# Patient Record
Sex: FEMALE | Race: WHITE | NOT HISPANIC OR LATINO | URBAN - METROPOLITAN AREA
[De-identification: names, ages, dates, MRNs, and addresses within clinical notes are randomized per-mention and may not be internally consistent; named-entity substitution may affect disease eponyms.]

---

## 2021-04-19 ENCOUNTER — TELEPHONE (OUTPATIENT)
Dept: OBGYN CLINIC | Facility: CLINIC | Age: 56
End: 2021-04-19

## 2021-04-19 NOTE — TELEPHONE ENCOUNTER
Patient l/m requesting screening mammogram order  Patient had not had a mammogram done in 2 years due to Kristi  She is scheduled for a WA with Dr Francia Harris on 06/2021  L/m on pt's vm advising at time of visit for Inderjit Layne, Dr Francia Harris will provide with screening mammogram order

## 2021-04-20 NOTE — TELEPHONE ENCOUNTER
Sharon Alonzo/prince she is getting anxious about waiting for her mammogram  She has a history of having call backs for further imaging  Return call to patient, prior mammogram done 7/2019  She did not have mammo in 2020 due to covid 19  She reports she has a history of needing breast u/s after mammogram in prior years  Her WA was moved by SO from 05/19/2021 to 7/5/2021  Dr Karla Lange, ok to provide screening mammogram order?

## 2021-09-13 ENCOUNTER — VBI (OUTPATIENT)
Dept: ADMINISTRATIVE | Facility: OTHER | Age: 56
End: 2021-09-13

## 2021-09-13 NOTE — TELEPHONE ENCOUNTER
Pt wants to know if she can have her medication automatically refilled every 3 months. LOV 08/05/2020   Upon review of the In Basket request we were able to locate, review, and update the patient chart as requested for Mammogram     Any additional questions or concerns should be emailed to the Practice Liaisons via [email or web address] email, please do not reply via In Basket      Thank you  Roxanna Lovell

## 2022-07-06 ENCOUNTER — ANNUAL EXAM (OUTPATIENT)
Dept: OBGYN CLINIC | Facility: CLINIC | Age: 57
End: 2022-07-06
Payer: COMMERCIAL

## 2022-07-06 VITALS
HEIGHT: 65 IN | DIASTOLIC BLOOD PRESSURE: 70 MMHG | BODY MASS INDEX: 23.16 KG/M2 | WEIGHT: 139 LBS | SYSTOLIC BLOOD PRESSURE: 110 MMHG

## 2022-07-06 DIAGNOSIS — Z12.4 SCREENING FOR CERVICAL CANCER: ICD-10-CM

## 2022-07-06 DIAGNOSIS — N92.6 IRREGULAR BLEEDING: ICD-10-CM

## 2022-07-06 DIAGNOSIS — Z01.411 ENCNTR FOR GYN EXAM (GENERAL) (ROUTINE) W ABNORMAL FINDINGS: Primary | ICD-10-CM

## 2022-07-06 DIAGNOSIS — Z12.31 ENCOUNTER FOR SCREENING MAMMOGRAM FOR MALIGNANT NEOPLASM OF BREAST: ICD-10-CM

## 2022-07-06 PROCEDURE — S0612 ANNUAL GYNECOLOGICAL EXAMINA: HCPCS | Performed by: OBSTETRICS & GYNECOLOGY

## 2022-07-06 RX ORDER — ATORVASTATIN CALCIUM 20 MG/1
20 TABLET, FILM COATED ORAL DAILY
COMMUNITY
Start: 2022-06-30

## 2022-07-06 RX ORDER — NITROGLYCERIN 0.1MG/HR
PATCH, TRANSDERMAL 24 HOURS TRANSDERMAL
COMMUNITY
Start: 2022-06-27

## 2022-07-06 NOTE — PROGRESS NOTES
Annual Wellness Visit  [address]   [address], [address], [facility], [place]    ASSESSMENT/PLAN: Delmi Craig is a 62 y o  B0E0 who presents for annual gynecologic exam     Encounter for routine gynecologic examination  - Routine well woman exam completed today  - Cervical Cancer Screening: Current ASCCP Guidelines reviewed  Last Pap: 04/17/2017  Next Pap Due: today, routine   - Contraceptive counseling discussed  Current contraception: vasectomy  - Breast Cancer Screening: Last Mammogram 07/16/2019  Has order for mammogram to be done in 2022  - The following were reviewed in today's visit: breast self exam    Additional problems addressed during this visit:  1  Encntr for gyn exam (general) (routine) w abnormal findings    2  Screening for cervical cancer  -     IGP, Aptima HPV, Rfx 16/18,45    3  Encounter for screening mammogram for malignant neoplasm of breast        -   Has order for mammogram, provided in May 2022    4  Irregular bleeding  -  Advised patient to keep menstrual calender  Have labs and U/S done this week  Inform Gyn if vaginal bleeding does not stop within 7 days from start of menses/bleeding  RTO in 2-4 weeks to review results of labs, U/S and bleeding pattern  -     Follicle stimulating hormone; Future  -     Luteinizing hormone; Future  -     Prolactin; Future  -     TSH, 3rd generation with Free T4 reflex; Future  -     ESTRADIOL, FREE SERUM; Future; Expected date: 43/13/0516  -     Follicle stimulating hormone  -     Luteinizing hormone  -     Prolactin  -     TSH, 3rd generation with Free T4 reflex  -     ESTRADIOL, FREE SERUM  -     US pelvis complete w transvaginal; Future; Expected date: 07/06/2022    Next visit: 1 yr for Inderjit Maki  OC in 2-4 wks    CC:  Annual Gynecologic Examination    HPI: Delmi Craig is a 62 y o  E5T8 who presents for annual gynecologic examination    Patient presents for Gyn exam   Has had regular menses q 4 weeks until 5 months ago  No vaginal bleeding since then until 2022  Patient describes current vaginal bleeding as regular menstrual flow  Denies having any dysmenorrhea or heavy flow      Gyn History  Patient's last menstrual period was 2022 (exact date)  Last Pap: 2017 was normal    She  reports being sexually active and has had partner(s) who are male  She reports using the following method of birth control/protection: Male Sterilization  OB History      Past Medical History:  No date: Breast cyst, left  No date: Dense breasts  No date: Endometrial polyp     Past Surgical History:  :  SECTION  No date: COLONOSCOPY      Comment:  [facility]  2015: HYSTEROSCOPY W/ POLYPECTOMY      Comment:  w/D&C  2019: MAMMO (HISTORICAL); Bilateral      Comment:  [facility] BI-RADS 2  BBenign findings   Extremely dense greater               than 75% glandular breast tissue  : VAGINAL BIRTH AFTER  SECTION     Family History   Problem Relation Age of Onset    Hyperlipidemia Mother    [facility] Rheum arthritis Mother     Stroke Father     No Known Problems Daughter     Celiac disease Son     No Known Problems Son     Hyperlipidemia Maternal Grandmother     Heart disease Maternal Grandmother     Heart attack Maternal Grandmother     Heart disease Maternal Grandfather     Stroke Maternal Grandfather     COPD Paternal Grandmother     Stomach cancer Paternal Grandfather     Colon cancer Maternal Aunt     Breast cancer Paternal Aunt     Ovarian cancer Paternal Aunt         Social History     Tobacco Use    Smoking status: Never Smoker    Smokeless tobacco: Never Used   Vaping Use    Vaping Use: Never used   Substance Use Topics    Alcohol use: Yes     Comment: Socially on  weekends (1-4/week)     Drug use: Never     Comment: No           Current Outpatient Medications:     atorvastatin (LIPITOR) 20 mg tablet, Take 20 mg by mouth daily, Disp: , Rfl:     Multiple Vitamins-Minerals (Multivitamin Women 50+) TABS, Take 1 tablet by mouth daily, Disp: , Rfl:     nitroglycerin (NITRODUR) 0 1 mg/hr, apply 1 PATCH by transdermal route daily  cut in half AND apply TO site FOR 24 hours  discontinue if headaches OR SKIN irritation  allow nitrate-free interval of approx 10-12 hours PER 24 hours period  , Disp: , Rfl:     atorvastatin (LIPITOR) 10 mg tablet, Take 1 tablet by mouth daily (Patient not taking: Reported on 7/6/2022), Disp: , Rfl:     Calcium Carbonate (CALCIUM 500 PO), Take 1 tablet by mouth daily, Disp: , Rfl:     She has No Known Allergies       ROS negative except as noted in HPI    Objective:  /70 (BP Location: Left arm, Patient Position: Sitting, Cuff Size: Standard)   Ht 5' 4 75" (1 645 m)   Wt 63 kg (139 lb)   LMP 07/01/2022 (Exact Date)   Breastfeeding No   BMI 23 31 kg/m²      Physical Exam  Vitals and nursing note reviewed  HENT:      Head: Normocephalic  Chest:   Breasts: Breasts are symmetrical       Right: Normal  No bleeding, mass, nipple discharge, skin change, tenderness or axillary adenopathy  Left: Normal  No bleeding, mass, nipple discharge, skin change, tenderness or axillary adenopathy  Abdominal:      General: There is no distension  Palpations: Abdomen is soft  There is no mass  Tenderness: There is no abdominal tenderness  There is no rebound  Genitourinary:     General: Normal vulva  Exam position: Lithotomy position  Labia:         Right: No rash, tenderness or lesion  Left: No rash, tenderness or lesion  Urethra: No urethral pain or urethral lesion  Vagina: Normal  No vaginal discharge  Cervix: No discharge, friability, lesion or erythema  Uterus: Normal        Adnexa: Right adnexa normal and left adnexa normal       Rectum: No external hemorrhoid  Comments: Minimum blood in vaginal vault    Dark blood noted from external cervical os  Musculoskeletal:      Right lower leg: No edema  Left lower leg: No edema  Lymphadenopathy:      Upper Body:      Right upper body: No axillary or pectoral adenopathy  Left upper body: No axillary or pectoral adenopathy  Skin:     General: Skin is warm  Neurological:      Mental Status: She is alert and oriented to person, place, and time  Psychiatric:         Mood and Affect: Mood normal          Behavior: Behavior normal          Thought Content:  Thought content normal

## 2022-07-12 LAB
CYTOLOGIST CVX/VAG CYTO: NORMAL
DX ICD CODE: NORMAL
HPV I/H RISK 4 DNA CVX QL PROBE+SIG AMP: NEGATIVE
Lab: NORMAL
OTHER STN SPEC: NORMAL
PATH REPORT.FINAL DX SPEC: NORMAL
SL AMB NOTE:: NORMAL
SL AMB SPECIMEN ADEQUACY: NORMAL
SL AMB TEST METHODOLOGY: NORMAL

## 2022-07-16 LAB
ESTRADIOL FREE MFR SERPL: 1.5 %
ESTRADIOL FREE SERPL-MCNC: 0.42 PG/ML
ESTRADIOL SERPL HS-MCNC: 28 PG/ML
FSH SERPL-ACNC: 54 MIU/ML
LH SERPL-ACNC: 46.1 MIU/ML
PROLACTIN SERPL-MCNC: 9.2 NG/ML (ref 4.8–23.3)
TSH SERPL DL<=0.005 MIU/L-ACNC: 2.49 UIU/ML (ref 0.45–4.5)

## 2022-08-03 ENCOUNTER — OFFICE VISIT (OUTPATIENT)
Dept: OBGYN CLINIC | Facility: CLINIC | Age: 57
End: 2022-08-03

## 2022-08-03 VITALS
HEIGHT: 65 IN | WEIGHT: 141 LBS | DIASTOLIC BLOOD PRESSURE: 62 MMHG | SYSTOLIC BLOOD PRESSURE: 108 MMHG | BODY MASS INDEX: 23.49 KG/M2

## 2022-08-03 DIAGNOSIS — N92.6 IRREGULAR MENSES: Primary | ICD-10-CM

## 2022-09-07 NOTE — PROGRESS NOTES
Assessment/Plan:      Diagnoses and all orders for this visit:    Irregular menses       -   Informed patient of U/S findings being unremarkable with EMS=3mm and labs c/w patient being menopausal   Advised patient to monitor for vaginal bleeding and inform Ob/Gyn if PMB occurs    Subjective:     Patient ID: Helder Paz is a 62 y o  female  Patient presents to review U/S and lab results for h/o irregular (Oligomenorrhea) vaginal bleeding      Review of Systems   Constitutional: Negative for activity change and unexpected weight change  Genitourinary: Negative for difficulty urinating, dyspareunia, dysuria, pelvic pain, vaginal bleeding, vaginal discharge and vaginal pain           Objective:     Physical Exam